# Patient Record
Sex: MALE | Race: BLACK OR AFRICAN AMERICAN | Employment: FULL TIME | URBAN - METROPOLITAN AREA
[De-identification: names, ages, dates, MRNs, and addresses within clinical notes are randomized per-mention and may not be internally consistent; named-entity substitution may affect disease eponyms.]

---

## 2017-11-03 ENCOUNTER — APPOINTMENT (OUTPATIENT)
Dept: GENERAL RADIOLOGY | Age: 22
End: 2017-11-03
Attending: EMERGENCY MEDICINE
Payer: MEDICAID

## 2017-11-03 ENCOUNTER — APPOINTMENT (OUTPATIENT)
Dept: CT IMAGING | Age: 22
End: 2017-11-03
Attending: PHYSICIAN ASSISTANT
Payer: MEDICAID

## 2017-11-03 ENCOUNTER — HOSPITAL ENCOUNTER (EMERGENCY)
Age: 22
Discharge: HOME | End: 2017-11-03
Attending: EMERGENCY MEDICINE
Payer: MEDICAID

## 2017-11-03 VITALS
TEMPERATURE: 98 F | DIASTOLIC BLOOD PRESSURE: 61 MMHG | BODY MASS INDEX: 23 KG/M2 | WEIGHT: 165 LBS | SYSTOLIC BLOOD PRESSURE: 130 MMHG | HEART RATE: 72 BPM | RESPIRATION RATE: 20 BRPM | OXYGEN SATURATION: 99 %

## 2017-11-03 DIAGNOSIS — S80.11XA CONTUSION OF RIGHT LOWER LEG, INITIAL ENCOUNTER: ICD-10-CM

## 2017-11-03 DIAGNOSIS — S05.92XA LEFT EYE INJURY, INITIAL ENCOUNTER: ICD-10-CM

## 2017-11-03 DIAGNOSIS — S09.90XA INJURY OF HEAD, INITIAL ENCOUNTER: Primary | ICD-10-CM

## 2017-11-03 DIAGNOSIS — S90.31XA CONTUSION OF RIGHT FOOT, INITIAL ENCOUNTER: ICD-10-CM

## 2017-11-03 PROCEDURE — 96372 THER/PROPH/DIAG INJ SC/IM: CPT

## 2017-11-03 PROCEDURE — 99284 EMERGENCY DEPT VISIT MOD MDM: CPT

## 2017-11-03 PROCEDURE — 73610 X-RAY EXAM OF ANKLE: CPT | Performed by: EMERGENCY MEDICINE

## 2017-11-03 PROCEDURE — 73590 X-RAY EXAM OF LOWER LEG: CPT | Performed by: EMERGENCY MEDICINE

## 2017-11-03 PROCEDURE — 73630 X-RAY EXAM OF FOOT: CPT | Performed by: EMERGENCY MEDICINE

## 2017-11-03 PROCEDURE — 70486 CT MAXILLOFACIAL W/O DYE: CPT | Performed by: EMERGENCY MEDICINE

## 2017-11-03 PROCEDURE — 70450 CT HEAD/BRAIN W/O DYE: CPT | Performed by: EMERGENCY MEDICINE

## 2017-11-03 RX ORDER — HYDROCODONE BITARTRATE AND ACETAMINOPHEN 5; 325 MG/1; MG/1
1-2 TABLET ORAL EVERY 6 HOURS PRN
Qty: 20 TABLET | Refills: 0 | Status: SHIPPED | OUTPATIENT
Start: 2017-11-03 | End: 2017-11-10

## 2017-11-03 RX ORDER — TETRACAINE HYDROCHLORIDE 5 MG/ML
1 SOLUTION OPHTHALMIC ONCE
Status: COMPLETED | OUTPATIENT
Start: 2017-11-03 | End: 2017-11-03

## 2017-11-03 RX ORDER — TETRACAINE HYDROCHLORIDE 5 MG/ML
SOLUTION OPHTHALMIC
Status: COMPLETED
Start: 2017-11-03 | End: 2017-11-03

## 2017-11-03 RX ORDER — HYDROMORPHONE HYDROCHLORIDE 1 MG/ML
1 INJECTION, SOLUTION INTRAMUSCULAR; INTRAVENOUS; SUBCUTANEOUS ONCE
Status: COMPLETED | OUTPATIENT
Start: 2017-11-03 | End: 2017-11-03

## 2017-11-03 NOTE — ED INITIAL ASSESSMENT (HPI)
States while at work he was lifting pallets out of truck, pallets fell and landed on right foot and struck him in face and fell under the pallets unknown loc per co worker. Patient with large hematoma noted to right eye and upper lid.  Also pain and swellin

## 2017-11-03 NOTE — ED PROVIDER NOTES
Patient Seen in: THE [facility] Emergency Department In [place]    History   Patient presents with:  Lower Extremity Injury (musculoskeletal)  Head Neck Injury (neurologic, musculoskeletal)    Stated Complaint: Pallet wood fell on patient- eye pain and right 18  Temp: 98 °F (36.7 °C)  Temp src: Temporal  SpO2: 100 %  O2 Device: None (Room air)    Current:/61   Pulse 72   Temp 98 °F (36.7 °C) (Temporal)   Resp 20   Wt 74.8 kg   SpO2 99%   BMI 23.01 kg/m²     Physical Exam   Constitutional: He is oriented Course  ------------------------------------------------------------   This case is discussed with Dr. Silvia Espana who evaluates patient agrees with the plan of care.   Xr Ankle (min 3 Views), Right (cpt=73610)    Result Date: 11/3/2017  PROCEDURE:  XR ANKLE (MI knee joint. Pain and swelling distal half of right lower leg laterally. FINDINGS:  BONES:  No significant arthropathy or acute abnormality. SOFT TISSUES:  No visible soft tissue swelling. EFFUSION:  None visible. CONCLUSION:  1.  Osseous structures scanning is performed through the facial bones. 3D shaded surface renderings are created on an independent CT scanner workstation. Dose reduction techniques were used.  Dose information is transmitted to the [facility]) Nathen Vasquez [address] today and remains so upon discharge. I discuss the plan of care with the patient, who expresses understanding. All questions and concerns are addressed to the patient's satisfaction prior to discharge today.         Disposition and Plan     Clinical Impre

## 2017-11-08 ENCOUNTER — OFFICE VISIT (OUTPATIENT)
Dept: OTHER | Facility: HOSPITAL | Age: 22
End: 2017-11-08
Attending: FAMILY MEDICINE
Payer: OTHER MISCELLANEOUS

## 2017-11-08 ENCOUNTER — HOSPITAL ENCOUNTER (OUTPATIENT)
Dept: GENERAL RADIOLOGY | Facility: HOSPITAL | Age: 22
Discharge: HOME | End: 2017-11-08
Attending: FAMILY MEDICINE
Payer: OTHER MISCELLANEOUS

## 2017-11-08 DIAGNOSIS — S80.01XD CONTUSION OF RIGHT KNEE, SUBSEQUENT ENCOUNTER: Primary | ICD-10-CM

## 2017-11-08 PROCEDURE — 73564 X-RAY EXAM KNEE 4 OR MORE: CPT | Performed by: FAMILY MEDICINE

## 2017-11-08 RX ORDER — TRAMADOL HYDROCHLORIDE 50 MG/1
TABLET ORAL
Qty: 30 TABLET | Refills: 0 | Status: SHIPPED | OUTPATIENT
Start: 2017-11-08 | End: 2019-01-28

## 2017-11-15 ENCOUNTER — APPOINTMENT (OUTPATIENT)
Dept: OTHER | Facility: HOSPITAL | Age: 22
End: 2017-11-15
Attending: PREVENTIVE MEDICINE
Payer: OTHER MISCELLANEOUS

## 2017-11-16 ENCOUNTER — APPOINTMENT (OUTPATIENT)
Dept: PHYSICAL THERAPY | Age: 22
End: 2017-11-16
Attending: PREVENTIVE MEDICINE
Payer: OTHER MISCELLANEOUS

## 2017-11-16 PROBLEM — S97.81XA CRUSHING INJURY OF RIGHT FOOT, INITIAL ENCOUNTER: Status: ACTIVE | Noted: 2017-11-16

## 2017-11-20 ENCOUNTER — APPOINTMENT (OUTPATIENT)
Dept: PHYSICAL THERAPY | Age: 22
End: 2017-11-20
Attending: PREVENTIVE MEDICINE
Payer: OTHER MISCELLANEOUS

## 2017-11-22 ENCOUNTER — APPOINTMENT (OUTPATIENT)
Dept: PHYSICAL THERAPY | Age: 22
End: 2017-11-22
Attending: PREVENTIVE MEDICINE
Payer: OTHER MISCELLANEOUS

## 2017-11-27 ENCOUNTER — APPOINTMENT (OUTPATIENT)
Dept: PHYSICAL THERAPY | Age: 22
End: 2017-11-27
Attending: PREVENTIVE MEDICINE
Payer: OTHER MISCELLANEOUS

## 2017-11-30 ENCOUNTER — APPOINTMENT (OUTPATIENT)
Dept: PHYSICAL THERAPY | Age: 22
End: 2017-11-30
Attending: PREVENTIVE MEDICINE
Payer: OTHER MISCELLANEOUS

## 2017-12-04 ENCOUNTER — APPOINTMENT (OUTPATIENT)
Dept: PHYSICAL THERAPY | Age: 22
End: 2017-12-04
Attending: PREVENTIVE MEDICINE
Payer: OTHER MISCELLANEOUS

## 2017-12-07 ENCOUNTER — APPOINTMENT (OUTPATIENT)
Dept: PHYSICAL THERAPY | Age: 22
End: 2017-12-07
Attending: PREVENTIVE MEDICINE
Payer: OTHER MISCELLANEOUS

## 2017-12-18 PROBLEM — T14.8XXA BONE BRUISE: Status: ACTIVE | Noted: 2017-12-18

## 2018-02-23 ENCOUNTER — OFFICE VISIT (OUTPATIENT)
Dept: PHYSICAL THERAPY | Age: 23
End: 2018-02-23
Attending: ORTHOPAEDIC SURGERY
Payer: MEDICAID

## 2018-02-23 DIAGNOSIS — S97.81XA CRUSHING INJURY OF RIGHT FOOT, INITIAL ENCOUNTER: ICD-10-CM

## 2018-02-23 PROCEDURE — 97162 PT EVAL MOD COMPLEX 30 MIN: CPT

## 2018-02-23 PROCEDURE — 97110 THERAPEUTIC EXERCISES: CPT

## 2018-02-23 NOTE — PROGRESS NOTES
LOWER EXTREMITY EVALUATION:   Referring Physician: Dr. Saadia Shin  Diagnosis: R foot crush injury     Date of Service: 2/23/2018     PATIENT SUMMARY   Manfred Strauss is a 23year old y/o male who presents to therapy today with complaints of R foot pain since cru normal, and get rid of the tingling in his foot. Past medical history was reviewed with Miguel Ángel Ambriz.  Significant findings include asthma, smokes ~1/4 pack per day    [address] presents with high pain irritability, decreased R ankle AROM, poor tolerance channel sensitivity (cold, stress)  · The concept of neuroplasticity and how factors such as temperature, stress, movement, immunity and blood flow affect pain via ion channel expression.   · The concepts of hyperalgesia   · Plasticity of the nervous system have improved SLS to >30s for increased ankle stability with ambulation on uneven surfaces (10 visits)  · Pt will report <3/10 pain with activities such as floor transfers and driving >20 min (10 visits)  · Pt will be independent and compliant with compreh

## 2018-02-28 ENCOUNTER — OFFICE VISIT (OUTPATIENT)
Dept: PHYSICAL THERAPY | Age: 23
End: 2018-02-28
Attending: ORTHOPAEDIC SURGERY
Payer: MEDICAID

## 2018-02-28 PROCEDURE — 97110 THERAPEUTIC EXERCISES: CPT

## 2018-02-28 NOTE — PROGRESS NOTES
Dx: R foot crush injury         Authorized # of Visits:  Medicaid         Next MD visit: none scheduled   Fall Risk: standard         Precautions: n/a             Subjective: Did good with the HEP; did the alphabet a lot because it feels good to go 'side t JAVI warren on step* x10         Seated tilt board -A/P x20  -M/L x20  -CW/CCW x15 ea         PF STM with tennis ball x1 min (hurts)  -green therabar roll x2 min         YTB  -DF 2x8  -PF 2x8  -INV 2x8  -EV 2x8         Skilled Services: Reviewed HEP*.  Rodney

## 2018-03-02 ENCOUNTER — OFFICE VISIT (OUTPATIENT)
Dept: PHYSICAL THERAPY | Age: 23
End: 2018-03-02
Attending: ORTHOPAEDIC SURGERY
Payer: MEDICAID

## 2018-03-02 DIAGNOSIS — S97.81XA CRUSHING INJURY OF RIGHT FOOT, INITIAL ENCOUNTER: ICD-10-CM

## 2018-03-02 PROCEDURE — 97110 THERAPEUTIC EXERCISES: CPT

## 2018-03-02 NOTE — PROGRESS NOTES
Dx: R foot crush injury         Authorized # of Visits:  Medicaid         Next MD visit: none scheduled   Fall Risk: standard         Precautions: n/a           25 min late. (thought his appt was 8 vs 7:45)  Subjective: Felt 'ok' after last PT session.  Did -OKC DF/PF x10    -x10  -x10        Seated ankle alphabet* x1 -        Towel crunch* HEP; marble  x16 (hard) x16 (3 min)        TC glides on step* x10 -        Seated tilt board -A/P x20  -M/L x20  -CW/CCW x15 ea   -x1 min  -x1 min  -x1 min ea

## 2018-03-05 ENCOUNTER — OFFICE VISIT (OUTPATIENT)
Dept: PHYSICAL THERAPY | Age: 23
End: 2018-03-05
Attending: ORTHOPAEDIC SURGERY
Payer: MEDICAID

## 2018-03-05 DIAGNOSIS — S97.81XA CRUSHING INJURY OF RIGHT FOOT, INITIAL ENCOUNTER: ICD-10-CM

## 2018-03-05 PROCEDURE — 97110 THERAPEUTIC EXERCISES: CPT

## 2018-03-05 NOTE — PROGRESS NOTES
Dx: R foot crush injury         Authorized # of Visits:  Medicaid         Next MD visit: none scheduled   Fall Risk: standard         Precautions: n/a           6 min late  Subjective: Reports he was a little looser after the last session, but by 9pm that independent and compliant with comprehensive HEP to maintain progress achieved in PT (10 visits) -progress    Plan: slow progress with goals, but continue to promote ROM with progressive wt bearing activities as tolerated; try IFC at end of session for inc

## 2018-03-09 ENCOUNTER — OFFICE VISIT (OUTPATIENT)
Dept: PHYSICAL THERAPY | Age: 23
End: 2018-03-09
Attending: ORTHOPAEDIC SURGERY
Payer: MEDICAID

## 2018-03-09 DIAGNOSIS — S97.81XA CRUSHING INJURY OF RIGHT FOOT, INITIAL ENCOUNTER: ICD-10-CM

## 2018-03-09 PROCEDURE — 97110 THERAPEUTIC EXERCISES: CPT

## 2018-03-09 NOTE — PROGRESS NOTES
Progress Summary  Pt has attended 5, cancelled 0, and no shown 0 visits in Physical Therapy.      Dx: R foot crush injury         Authorized # of Visits:  Medicaid         Next MD visit: none scheduled   Fall Risk: standard         Precautions: n/a medication, etc) to help improve efficacy of therapy.      Objective: tender to light touch L lateral malleolus, 4th and 5th digit, cuboid      Goals:    · Pt will demonstrate improved DF AROM to >= 10 degrees to promote proper foot clearance during gait an 4/10 Date: 3/9/2018 TX#: 5/10 Date:               TX#: 6/ Date:               TX#: 7/ Date:               TX#: 8/   Bike L1 x4 min x3 min x4 min -      STM L foot very light pressure x8 min x8 min x8 min (Min pressure) x8 min with biofreeze      AROM seate mechanisms and strategies to increase the brain's production of chemicals which decrease pain such as aerobic exercise and improved pain knowledge (how to make a 'dry' brain 'wet')    Current best evidence research indicates that when a therapist explains

## 2018-03-12 ENCOUNTER — TELEPHONE (OUTPATIENT)
Dept: PHYSICAL THERAPY | Age: 23
End: 2018-03-12

## 2018-03-12 NOTE — TELEPHONE ENCOUNTER
No show. Called to follow up/confirm next visit. Pt reports he forgot his appt today.  Tried to call MD office regarding a follow-up appt; was told Dr. Grady Ellis was not available over the phone and he would not be able to set up a follow-up appt with Dr. Grady Ellis

## 2018-03-16 ENCOUNTER — APPOINTMENT (OUTPATIENT)
Dept: PHYSICAL THERAPY | Age: 23
End: 2018-03-16
Attending: ORTHOPAEDIC SURGERY
Payer: MEDICAID

## 2018-03-19 ENCOUNTER — OFFICE VISIT (OUTPATIENT)
Dept: PHYSICAL THERAPY | Age: 23
End: 2018-03-19
Attending: ORTHOPAEDIC SURGERY
Payer: MEDICAID

## 2018-03-19 DIAGNOSIS — S97.81XA CRUSHING INJURY OF RIGHT FOOT, INITIAL ENCOUNTER: ICD-10-CM

## 2018-03-19 PROCEDURE — 97110 THERAPEUTIC EXERCISES: CPT

## 2018-03-19 NOTE — PROGRESS NOTES
Dx: R foot crush injury         Authorized # of Visits:  Medicaid         Next MD visit: none scheduled   Fall Risk: standard         Precautions: n/a             Subjective:  Went for [employer] day Saturday and took his son to the [place] and [employer] w TX#: 6/10 Date:               TX#: 7/ Date:               TX#: 8/   Bike L1 x4 min x3 min x4 min - x5 min     STM L foot very light pressure x8 min x8 min x8 min (Min pressure) x8 min with biofreeze x8 min with biofreeze     AROM seated   -heel raise* x10

## 2018-03-23 ENCOUNTER — OFFICE VISIT (OUTPATIENT)
Dept: PHYSICAL THERAPY | Age: 23
End: 2018-03-23
Attending: ORTHOPAEDIC SURGERY
Payer: MEDICAID

## 2018-03-23 DIAGNOSIS — S97.81XA CRUSHING INJURY OF RIGHT FOOT, INITIAL ENCOUNTER: ICD-10-CM

## 2018-03-23 PROCEDURE — 97110 THERAPEUTIC EXERCISES: CPT

## 2018-03-23 NOTE — PROGRESS NOTES
Dx: R foot crush injury         Authorized # of Visits:  Medicaid         Next MD visit: none scheduled   Fall Risk: standard         Precautions: n/a             Subjective: Was in a lot of pain this week; thinks he did too much 'trying to be a superhero stabilization as tolerated; resume tape PRN    Date: 2/28/2018 TX#: 2/10 Date:3/2/2018 TX#: 3/10   Date: 3/5/2018 TX#: 4/10 Date: 3/9/2018 TX#: 5/10 Date: 3/19/2018   TX#: 6/10 Date:3/23/2018   TX#: 7/10 Date:               TX#: 8/   Bike L1 x4 min x3 min with R back) - TNE x10 min  Ice x10 min x10 min    Skilled Services: Increased TC mobilizations to help improve DF AROM and decrease anterior joint pain. Held standing tilt board due to irritation after last visit.  Continued standing stabilization/toleranc

## 2018-04-02 ENCOUNTER — APPOINTMENT (OUTPATIENT)
Dept: PHYSICAL THERAPY | Age: 23
End: 2018-04-02
Attending: ORTHOPAEDIC SURGERY
Payer: MEDICAID

## 2018-04-04 ENCOUNTER — APPOINTMENT (OUTPATIENT)
Dept: PHYSICAL THERAPY | Age: 23
End: 2018-04-04
Attending: ORTHOPAEDIC SURGERY
Payer: MEDICAID

## 2018-04-11 ENCOUNTER — APPOINTMENT (OUTPATIENT)
Dept: PHYSICAL THERAPY | Age: 23
End: 2018-04-11
Attending: ORTHOPAEDIC SURGERY
Payer: MEDICAID

## 2018-04-13 ENCOUNTER — APPOINTMENT (OUTPATIENT)
Dept: PHYSICAL THERAPY | Age: 23
End: 2018-04-13
Attending: ORTHOPAEDIC SURGERY
Payer: MEDICAID

## 2018-04-16 ENCOUNTER — APPOINTMENT (OUTPATIENT)
Dept: PHYSICAL THERAPY | Age: 23
End: 2018-04-16
Attending: ORTHOPAEDIC SURGERY
Payer: MEDICAID

## 2018-04-18 ENCOUNTER — APPOINTMENT (OUTPATIENT)
Dept: PHYSICAL THERAPY | Age: 23
End: 2018-04-18
Attending: ORTHOPAEDIC SURGERY
Payer: MEDICAID

## 2019-01-28 ENCOUNTER — APPOINTMENT (OUTPATIENT)
Dept: CT IMAGING | Facility: HOSPITAL | Age: 24
End: 2019-01-28
Attending: EMERGENCY MEDICINE
Payer: MEDICAID

## 2019-01-28 ENCOUNTER — HOSPITAL ENCOUNTER (EMERGENCY)
Facility: HOSPITAL | Age: 24
Discharge: HOME | End: 2019-01-28
Attending: EMERGENCY MEDICINE
Payer: MEDICAID

## 2019-01-28 ENCOUNTER — APPOINTMENT (OUTPATIENT)
Dept: GENERAL RADIOLOGY | Facility: HOSPITAL | Age: 24
End: 2019-01-28
Attending: EMERGENCY MEDICINE
Payer: MEDICAID

## 2019-01-28 VITALS
HEIGHT: 74 IN | WEIGHT: 175 LBS | SYSTOLIC BLOOD PRESSURE: 116 MMHG | OXYGEN SATURATION: 99 % | RESPIRATION RATE: 14 BRPM | DIASTOLIC BLOOD PRESSURE: 88 MMHG | TEMPERATURE: 99 F | HEART RATE: 79 BPM | BODY MASS INDEX: 22.46 KG/M2

## 2019-01-28 DIAGNOSIS — R10.9 RIGHT FLANK PAIN: Primary | ICD-10-CM

## 2019-01-28 LAB
BILIRUB UR QL STRIP.AUTO: NEGATIVE
COLOR UR AUTO: YELLOW
GLUCOSE UR STRIP.AUTO-MCNC: NEGATIVE MG/DL
KETONES UR STRIP.AUTO-MCNC: NEGATIVE MG/DL
LEUKOCYTE ESTERASE UR QL STRIP.AUTO: NEGATIVE
NITRITE UR QL STRIP.AUTO: NEGATIVE
PH UR STRIP.AUTO: 6 [PH] (ref 4.5–8)
PROT UR STRIP.AUTO-MCNC: NEGATIVE MG/DL
RBC UR QL AUTO: NEGATIVE
SP GR UR STRIP.AUTO: 1.03 (ref 1–1.03)
UROBILINOGEN UR STRIP.AUTO-MCNC: 1 MG/DL

## 2019-01-28 PROCEDURE — 87086 URINE CULTURE/COLONY COUNT: CPT | Performed by: EMERGENCY MEDICINE

## 2019-01-28 PROCEDURE — 71045 X-RAY EXAM CHEST 1 VIEW: CPT | Performed by: EMERGENCY MEDICINE

## 2019-01-28 PROCEDURE — 81003 URINALYSIS AUTO W/O SCOPE: CPT | Performed by: EMERGENCY MEDICINE

## 2019-01-28 PROCEDURE — 74176 CT ABD & PELVIS W/O CONTRAST: CPT | Performed by: EMERGENCY MEDICINE

## 2019-01-28 PROCEDURE — 99285 EMERGENCY DEPT VISIT HI MDM: CPT

## 2019-01-28 PROCEDURE — 96372 THER/PROPH/DIAG INJ SC/IM: CPT

## 2019-01-28 PROCEDURE — 99284 EMERGENCY DEPT VISIT MOD MDM: CPT

## 2019-01-28 RX ORDER — MELOXICAM 15 MG/1
15 TABLET ORAL
COMMUNITY
Start: 2018-10-30

## 2019-01-28 RX ORDER — KETOROLAC TROMETHAMINE 30 MG/ML
30 INJECTION, SOLUTION INTRAMUSCULAR; INTRAVENOUS ONCE
Status: COMPLETED | OUTPATIENT
Start: 2019-01-28 | End: 2019-01-28

## 2019-01-28 RX ORDER — DIAZEPAM 5 MG/1
5 TABLET ORAL 3 TIMES DAILY PRN
Qty: 20 TABLET | Refills: 0 | Status: SHIPPED | OUTPATIENT
Start: 2019-01-28 | End: 2019-02-04

## 2019-01-28 RX ORDER — NAPROXEN 500 MG/1
500 TABLET ORAL 2 TIMES DAILY PRN
Qty: 20 TABLET | Refills: 0 | Status: SHIPPED | OUTPATIENT
Start: 2019-01-28 | End: 2019-02-04

## 2019-01-28 RX ORDER — MONTELUKAST SODIUM 10 MG/1
10 TABLET ORAL
COMMUNITY

## 2019-01-28 RX ORDER — FLUTICASONE PROPIONATE AND SALMETEROL 100; 50 UG/1; UG/1
1 POWDER RESPIRATORY (INHALATION)
COMMUNITY

## 2019-01-28 RX ORDER — ALBUTEROL SULFATE 0.75 MG/3ML
SOLUTION RESPIRATORY (INHALATION)
COMMUNITY

## 2019-01-29 NOTE — ED PROVIDER NOTES
Patient Seen in: [facility] Emergency Department    History   Patient presents with:  Back Pain (musculoskeletal)    Stated Complaint: back pain, no trauma or injury/worse with movement/no urinary complaints    HPI    Patient here for evaluation of 14   Ht 188 cm (6' 2\")   Wt 79.4 kg   SpO2 99%   BMI 22.47 kg/m²         Physical Exam      Constitutional: No distress. Appears well-developed and well-nourished. Head: Normocephalic and atraumatic.    Nose: Nose normal.   Eyes: EOM are normal. Pupils a cross. Patient had a negative d-dimer, negative troponin, clear chest x-ray. EKG without any ST changes or T wave inversions.       Disposition and Plan     Clinical Impression:  Right flank pain  (primary encounter diagnosis)    Disposition:  Discharge

## 2021-06-12 ENCOUNTER — HOSPITAL ENCOUNTER (EMERGENCY)
Age: 26
Discharge: HOME | End: 2021-06-12
Attending: EMERGENCY MEDICINE
Payer: COMMERCIAL

## 2021-06-12 ENCOUNTER — APPOINTMENT (OUTPATIENT)
Dept: CT IMAGING | Age: 26
End: 2021-06-12
Attending: EMERGENCY MEDICINE
Payer: COMMERCIAL

## 2021-06-12 ENCOUNTER — APPOINTMENT (OUTPATIENT)
Dept: ULTRASOUND IMAGING | Age: 26
End: 2021-06-12
Attending: EMERGENCY MEDICINE
Payer: COMMERCIAL

## 2021-06-12 VITALS
BODY MASS INDEX: 23.74 KG/M2 | TEMPERATURE: 98 F | DIASTOLIC BLOOD PRESSURE: 65 MMHG | HEIGHT: 74 IN | OXYGEN SATURATION: 98 % | SYSTOLIC BLOOD PRESSURE: 115 MMHG | RESPIRATION RATE: 16 BRPM | WEIGHT: 185 LBS | HEART RATE: 55 BPM

## 2021-06-12 DIAGNOSIS — N34.2 URETHRITIS: Primary | ICD-10-CM

## 2021-06-12 DIAGNOSIS — R10.31 RIGHT GROIN PAIN: ICD-10-CM

## 2021-06-12 PROCEDURE — 87591 N.GONORRHOEAE DNA AMP PROB: CPT | Performed by: EMERGENCY MEDICINE

## 2021-06-12 PROCEDURE — 87491 CHLMYD TRACH DNA AMP PROBE: CPT | Performed by: EMERGENCY MEDICINE

## 2021-06-12 PROCEDURE — 87086 URINE CULTURE/COLONY COUNT: CPT | Performed by: EMERGENCY MEDICINE

## 2021-06-12 PROCEDURE — 96374 THER/PROPH/DIAG INJ IV PUSH: CPT

## 2021-06-12 PROCEDURE — 93975 VASCULAR STUDY: CPT | Performed by: EMERGENCY MEDICINE

## 2021-06-12 PROCEDURE — 96361 HYDRATE IV INFUSION ADD-ON: CPT

## 2021-06-12 PROCEDURE — 74176 CT ABD & PELVIS W/O CONTRAST: CPT | Performed by: EMERGENCY MEDICINE

## 2021-06-12 PROCEDURE — 99284 EMERGENCY DEPT VISIT MOD MDM: CPT

## 2021-06-12 PROCEDURE — 76870 US EXAM SCROTUM: CPT | Performed by: EMERGENCY MEDICINE

## 2021-06-12 PROCEDURE — 85025 COMPLETE CBC W/AUTO DIFF WBC: CPT | Performed by: EMERGENCY MEDICINE

## 2021-06-12 PROCEDURE — 99285 EMERGENCY DEPT VISIT HI MDM: CPT

## 2021-06-12 PROCEDURE — 96372 THER/PROPH/DIAG INJ SC/IM: CPT

## 2021-06-12 PROCEDURE — 81001 URINALYSIS AUTO W/SCOPE: CPT | Performed by: EMERGENCY MEDICINE

## 2021-06-12 PROCEDURE — 80053 COMPREHEN METABOLIC PANEL: CPT | Performed by: EMERGENCY MEDICINE

## 2021-06-12 RX ORDER — AZITHROMYCIN 250 MG/1
1000 TABLET, FILM COATED ORAL ONCE
Status: COMPLETED | OUTPATIENT
Start: 2021-06-12 | End: 2021-06-12

## 2021-06-12 RX ORDER — KETOROLAC TROMETHAMINE 15 MG/ML
15 INJECTION, SOLUTION INTRAMUSCULAR; INTRAVENOUS ONCE
Status: COMPLETED | OUTPATIENT
Start: 2021-06-12 | End: 2021-06-12

## 2021-06-12 NOTE — ED INITIAL ASSESSMENT (HPI)
Right lower abdom pain, constant, that goes into right testicle and right leg, no swelling, no injury, denies nausea/vomiting/fever/diarrhea, started 2 days ago

## 2021-06-13 NOTE — ED PROVIDER NOTES
Patient Seen in: [address] Emergency Department In [facility]      History   Patient presents with:  Abdomen/Flank Pain    Stated Complaint: RLQ pain x 2 days    HPI/Subjective:   HPI    22-year-old male presents to emergency department with chief complaint (36.4 °C) (Temporal)   Resp 16   Ht 188 cm (6' 2\")   Wt 83.9 kg   SpO2 98%   BMI 23.75 kg/m²         Physical Exam    GENERAL: Patient is awake, alert, well-appearing, in no acute distress. HEENT: no scleral icterus.   Mucous membranes are moist  HEART: R Final result                 Please view results for these tests on the individual orders.    URINE CULTURE, ROUTINE   CHLAMYDIA/GONOCOCCUS, FRANCO   CBC W/ DIFFERENTIAL                   MDM      Patient IV established, blood work o

## 2021-10-21 ENCOUNTER — HOSPITAL ENCOUNTER (EMERGENCY)
Age: 26
Discharge: HOME | End: 2021-10-21
Attending: EMERGENCY MEDICINE

## 2021-10-21 ENCOUNTER — APPOINTMENT (OUTPATIENT)
Dept: GENERAL RADIOLOGY | Age: 26
End: 2021-10-21
Attending: EMERGENCY MEDICINE

## 2021-10-21 VITALS
SYSTOLIC BLOOD PRESSURE: 138 MMHG | RESPIRATION RATE: 18 BRPM | TEMPERATURE: 98 F | WEIGHT: 185 LBS | HEIGHT: 74 IN | DIASTOLIC BLOOD PRESSURE: 78 MMHG | HEART RATE: 69 BPM | OXYGEN SATURATION: 100 % | BODY MASS INDEX: 23.74 KG/M2

## 2021-10-21 DIAGNOSIS — S96.911A RIGHT FOOT STRAIN, INITIAL ENCOUNTER: Primary | ICD-10-CM

## 2021-10-21 PROCEDURE — 73630 X-RAY EXAM OF FOOT: CPT | Performed by: EMERGENCY MEDICINE

## 2021-10-21 PROCEDURE — 73610 X-RAY EXAM OF ANKLE: CPT | Performed by: EMERGENCY MEDICINE

## 2021-10-21 PROCEDURE — 99283 EMERGENCY DEPT VISIT LOW MDM: CPT

## 2021-10-21 PROCEDURE — 99284 EMERGENCY DEPT VISIT MOD MDM: CPT

## 2021-10-21 RX ORDER — HYDROCODONE BITARTRATE AND ACETAMINOPHEN 5; 325 MG/1; MG/1
1-2 TABLET ORAL EVERY 6 HOURS PRN
Qty: 10 TABLET | Refills: 0 | Status: SHIPPED | OUTPATIENT
Start: 2021-10-21 | End: 2021-10-28

## 2021-10-21 NOTE — ED PROVIDER NOTES
Patient Seen in: THE [facility] Emergency Department In [place]      History   Patient presents with:  Leg or Foot Injury    Stated Complaint: injured right foot playing baseball yesterday    Subjective:   20-year-old male presents with right foot and ankle p the lateral malleolus and proximal fibula. No base of 5th metatarsal tenderness. Right foot: Swelling and tenderness present. Feet:    Skin:     General: Skin is warm and dry. Capillary Refill: Capillary refill takes less than 2 seconds. identified. Phalangeal relationships are within normal limits. No significant ankle joint effusion. CONCLUSION:  No acute findings.    Dictated by (CST): Tarik Bassett MD on 10/21/2021 at 1:25 PM     Finalized by (CST): Tarik Bassett MD

## 2021-10-21 NOTE — ED INITIAL ASSESSMENT (HPI)
Pt injured his right foot playing baseball yesterday. States he stepped on a base and twisted his foot. Pt had previous surgery on foot.  Swelling noted

## 2021-12-27 ENCOUNTER — APPOINTMENT (OUTPATIENT)
Dept: GENERAL RADIOLOGY | Age: 26
End: 2021-12-27
Attending: EMERGENCY MEDICINE

## 2021-12-27 ENCOUNTER — HOSPITAL ENCOUNTER (EMERGENCY)
Age: 26
Discharge: HOME | End: 2021-12-27
Attending: EMERGENCY MEDICINE

## 2021-12-27 VITALS
HEIGHT: 74 IN | SYSTOLIC BLOOD PRESSURE: 130 MMHG | HEART RATE: 76 BPM | BODY MASS INDEX: 23.74 KG/M2 | TEMPERATURE: 97 F | WEIGHT: 185 LBS | DIASTOLIC BLOOD PRESSURE: 70 MMHG | OXYGEN SATURATION: 98 % | RESPIRATION RATE: 17 BRPM

## 2021-12-27 DIAGNOSIS — G89.29 CHRONIC RIGHT SHOULDER PAIN: Primary | ICD-10-CM

## 2021-12-27 DIAGNOSIS — M25.511 CHRONIC RIGHT SHOULDER PAIN: Primary | ICD-10-CM

## 2021-12-27 PROCEDURE — 73030 X-RAY EXAM OF SHOULDER: CPT | Performed by: EMERGENCY MEDICINE

## 2021-12-27 PROCEDURE — 99284 EMERGENCY DEPT VISIT MOD MDM: CPT | Performed by: EMERGENCY MEDICINE

## 2021-12-27 PROCEDURE — 72050 X-RAY EXAM NECK SPINE 4/5VWS: CPT | Performed by: EMERGENCY MEDICINE

## 2021-12-27 RX ORDER — IBUPROFEN 600 MG/1
600 TABLET ORAL ONCE
Status: COMPLETED | OUTPATIENT
Start: 2021-12-27 | End: 2021-12-27

## 2021-12-27 RX ORDER — METHYLPREDNISOLONE 4 MG/1
TABLET ORAL
Qty: 1 EACH | Refills: 0 | Status: SHIPPED | OUTPATIENT
Start: 2021-12-27

## 2021-12-28 NOTE — ED PROVIDER NOTES
Patient Seen in: THE [facility] Emergency Department In [place]      History   Patient presents with:  Arm or Hand Injury    Stated Complaint: right shoulder pain for two months worse this week    Subjective:   HPI    Patient is a pleasant 20-year-old male, r Extraocular Movements: Extraocular movements intact. Conjunctiva/sclera: Conjunctivae normal.   Neck:      Trachea: Trachea and phonation normal.     Cardiovascular:      Rate and Rhythm: Normal rate.    Pulmonary:      Effort: Pulmonary effort is norm VIEWS), RIGHT (CPT=73030)    Result Date: 12/27/2021            PROCEDURE:  XR SHOULDER, COMPLETE (MIN 2 VIEWS), RIGHT (CPT=73030)     TECHNIQUE:  Multiple views were obtained.   COMPARISON:  [facility], XR, SHOULDER (COMPLETE),RIGHT XRAY, 11/22/2012, 5:53 Prescribed:  Current Discharge Medication List    START taking these medications    methylPREDNISolone (MEDROL) 4 MG Oral Tablet Therapy Pack  Dosepack: take as directed  Qty: 1 each Refills: 0

## 2022-05-25 ENCOUNTER — APPOINTMENT (OUTPATIENT)
Dept: GENERAL RADIOLOGY | Age: 27
End: 2022-05-25
Attending: EMERGENCY MEDICINE

## 2022-05-25 ENCOUNTER — HOSPITAL ENCOUNTER (EMERGENCY)
Age: 27
Discharge: HOME | End: 2022-05-25
Attending: EMERGENCY MEDICINE

## 2022-05-25 VITALS
DIASTOLIC BLOOD PRESSURE: 59 MMHG | HEART RATE: 59 BPM | RESPIRATION RATE: 89 BRPM | OXYGEN SATURATION: 97 % | SYSTOLIC BLOOD PRESSURE: 115 MMHG | TEMPERATURE: 100 F | HEIGHT: 72 IN | WEIGHT: 165 LBS | BODY MASS INDEX: 22.35 KG/M2

## 2022-05-25 DIAGNOSIS — U07.1 COVID: ICD-10-CM

## 2022-05-25 DIAGNOSIS — S39.012A LUMBAR STRAIN, INITIAL ENCOUNTER: Primary | ICD-10-CM

## 2022-05-25 LAB
ALBUMIN SERPL-MCNC: 4.1 G/DL (ref 3.4–5)
ALBUMIN/GLOB SERPL: 1.2 {RATIO} (ref 1–2)
ALP LIVER SERPL-CCNC: 66 U/L
ALT SERPL-CCNC: 17 U/L
ANION GAP SERPL CALC-SCNC: 6 MMOL/L (ref 0–18)
AST SERPL-CCNC: 15 U/L (ref 15–37)
BASOPHILS # BLD AUTO: 0.03 X10(3) UL (ref 0–0.2)
BASOPHILS NFR BLD AUTO: 0.5 %
BILIRUB SERPL-MCNC: 0.4 MG/DL (ref 0.1–2)
BILIRUB UR QL STRIP.AUTO: NEGATIVE
BUN BLD-MCNC: 8 MG/DL (ref 7–18)
CALCIUM BLD-MCNC: 8.9 MG/DL (ref 8.5–10.1)
CHLORIDE SERPL-SCNC: 104 MMOL/L (ref 98–112)
CLARITY UR REFRACT.AUTO: CLEAR
CO2 SERPL-SCNC: 25 MMOL/L (ref 21–32)
COLOR UR AUTO: YELLOW
CREAT BLD-MCNC: 1.16 MG/DL
EOSINOPHIL # BLD AUTO: 0 X10(3) UL (ref 0–0.7)
EOSINOPHIL NFR BLD AUTO: 0 %
ERYTHROCYTE [DISTWIDTH] IN BLOOD BY AUTOMATED COUNT: 12.2 %
GLOBULIN PLAS-MCNC: 3.4 G/DL (ref 2.8–4.4)
GLUCOSE BLD-MCNC: 84 MG/DL (ref 70–99)
GLUCOSE UR STRIP.AUTO-MCNC: NEGATIVE MG/DL
HCT VFR BLD AUTO: 38.1 %
HGB BLD-MCNC: 13 G/DL
IMM GRANULOCYTES # BLD AUTO: 0.02 X10(3) UL (ref 0–1)
IMM GRANULOCYTES NFR BLD: 0.3 %
KETONES UR STRIP.AUTO-MCNC: 40 MG/DL
LEUKOCYTE ESTERASE UR QL STRIP.AUTO: NEGATIVE
LYMPHOCYTES # BLD AUTO: 0.37 X10(3) UL (ref 1–4)
LYMPHOCYTES NFR BLD AUTO: 6.2 %
MCH RBC QN AUTO: 30.9 PG (ref 26–34)
MCHC RBC AUTO-ENTMCNC: 34.1 G/DL (ref 31–37)
MCV RBC AUTO: 90.5 FL
MONOCYTES # BLD AUTO: 1.01 X10(3) UL (ref 0.1–1)
MONOCYTES NFR BLD AUTO: 17 %
NEUTROPHILS # BLD AUTO: 4.5 X10 (3) UL (ref 1.5–7.7)
NEUTROPHILS # BLD AUTO: 4.5 X10(3) UL (ref 1.5–7.7)
NEUTROPHILS NFR BLD AUTO: 76 %
NITRITE UR QL STRIP.AUTO: NEGATIVE
OSMOLALITY SERPL CALC.SUM OF ELEC: 278 MOSM/KG (ref 275–295)
PH UR STRIP.AUTO: 5.5 [PH] (ref 5–8)
PLATELET # BLD AUTO: 201 10(3)UL (ref 150–450)
POCT INFLUENZA A: NEGATIVE
POCT INFLUENZA B: NEGATIVE
POTASSIUM SERPL-SCNC: 3.4 MMOL/L (ref 3.5–5.1)
PROT SERPL-MCNC: 7.5 G/DL (ref 6.4–8.2)
PROT UR STRIP.AUTO-MCNC: NEGATIVE MG/DL
RBC # BLD AUTO: 4.21 X10(6)UL
RBC UR QL AUTO: NEGATIVE
SARS-COV-2 RNA RESP QL NAA+PROBE: DETECTED
SODIUM SERPL-SCNC: 135 MMOL/L (ref 136–145)
SP GR UR STRIP.AUTO: 1.02 (ref 1–1.03)
UROBILINOGEN UR STRIP.AUTO-MCNC: 0.2 MG/DL
WBC # BLD AUTO: 5.9 X10(3) UL (ref 4–11)

## 2022-05-25 PROCEDURE — 99284 EMERGENCY DEPT VISIT MOD MDM: CPT

## 2022-05-25 PROCEDURE — 87502 INFLUENZA DNA AMP PROBE: CPT | Performed by: EMERGENCY MEDICINE

## 2022-05-25 PROCEDURE — 85025 COMPLETE CBC W/AUTO DIFF WBC: CPT | Performed by: EMERGENCY MEDICINE

## 2022-05-25 PROCEDURE — 36415 COLL VENOUS BLD VENIPUNCTURE: CPT

## 2022-05-25 PROCEDURE — 81003 URINALYSIS AUTO W/O SCOPE: CPT | Performed by: EMERGENCY MEDICINE

## 2022-05-25 PROCEDURE — 87040 BLOOD CULTURE FOR BACTERIA: CPT | Performed by: EMERGENCY MEDICINE

## 2022-05-25 PROCEDURE — 71046 X-RAY EXAM CHEST 2 VIEWS: CPT | Performed by: EMERGENCY MEDICINE

## 2022-05-25 PROCEDURE — 96374 THER/PROPH/DIAG INJ IV PUSH: CPT

## 2022-05-25 PROCEDURE — 96361 HYDRATE IV INFUSION ADD-ON: CPT

## 2022-05-25 PROCEDURE — 80053 COMPREHEN METABOLIC PANEL: CPT | Performed by: EMERGENCY MEDICINE

## 2022-05-25 PROCEDURE — 72110 X-RAY EXAM L-2 SPINE 4/>VWS: CPT | Performed by: EMERGENCY MEDICINE

## 2022-05-25 RX ORDER — ACETAMINOPHEN 500 MG
1000 TABLET ORAL ONCE
Status: COMPLETED | OUTPATIENT
Start: 2022-05-25 | End: 2022-05-25

## 2022-05-25 RX ORDER — KETOROLAC TROMETHAMINE 15 MG/ML
15 INJECTION, SOLUTION INTRAMUSCULAR; INTRAVENOUS ONCE
Status: COMPLETED | OUTPATIENT
Start: 2022-05-25 | End: 2022-05-25

## 2022-05-25 RX ORDER — CYCLOBENZAPRINE HCL 10 MG
10 TABLET ORAL ONCE
Status: COMPLETED | OUTPATIENT
Start: 2022-05-25 | End: 2022-05-25

## 2022-05-25 RX ORDER — CYCLOBENZAPRINE HCL 10 MG
10 TABLET ORAL 3 TIMES DAILY PRN
Qty: 20 TABLET | Refills: 0 | Status: SHIPPED | OUTPATIENT
Start: 2022-05-25 | End: 2022-06-01

## 2022-05-26 NOTE — ED INITIAL ASSESSMENT (HPI)
Patient arrives with c/o bilateral lower back pain since yesterday, worse on the right side. Numbness present to bilateral legs since this morning. He states, \"I'm a  and my back gets stiff at times\". Patient also started running fevers today, home COVID test negative.

## 2023-08-14 ENCOUNTER — HOSPITAL ENCOUNTER (OUTPATIENT)
Age: 28
Discharge: HOME | End: 2023-08-14
Attending: EMERGENCY MEDICINE
Payer: COMMERCIAL

## 2023-08-14 ENCOUNTER — APPOINTMENT (OUTPATIENT)
Dept: GENERAL RADIOLOGY | Age: 28
End: 2023-08-14
Attending: EMERGENCY MEDICINE
Payer: COMMERCIAL

## 2023-08-14 VITALS
DIASTOLIC BLOOD PRESSURE: 69 MMHG | SYSTOLIC BLOOD PRESSURE: 125 MMHG | OXYGEN SATURATION: 95 % | TEMPERATURE: 98 F | BODY MASS INDEX: 24 KG/M2 | HEART RATE: 66 BPM | RESPIRATION RATE: 18 BRPM | WEIGHT: 180 LBS
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
## 2023-08-14 DIAGNOSIS — S39.012A LOW BACK STRAIN, INITIAL ENCOUNTER: ICD-10-CM

## 2023-08-14 PROCEDURE — 72110 X-RAY EXAM L-2 SPINE 4/>VWS: CPT | Performed by: EMERGENCY MEDICINE

## 2023-08-14 PROCEDURE — 99214 OFFICE O/P EST MOD 30 MIN: CPT

## 2023-08-14 PROCEDURE — 99213 OFFICE O/P EST LOW 20 MIN: CPT

## 2023-08-14 RX ORDER — NAPROXEN 500 MG/1
500 TABLET ORAL 2 TIMES DAILY PRN
Qty: 20 TABLET | Refills: 0 | Status: SHIPPED | OUTPATIENT
Start: 2023-08-14

## 2023-08-14 RX ORDER — CYCLOBENZAPRINE HCL 10 MG
10 TABLET ORAL 3 TIMES DAILY PRN
Qty: 20 TABLET | Refills: 0 | Status: SHIPPED | OUTPATIENT
Start: 2023-08-14 | End: 2023-08-21
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
## 2023-11-28 ENCOUNTER — HOSPITAL ENCOUNTER (OUTPATIENT)
Age: 28
Discharge: HOME | End: 2023-11-28
Attending: EMERGENCY MEDICINE

## 2023-11-28 VITALS
SYSTOLIC BLOOD PRESSURE: 145 MMHG | BODY MASS INDEX: 23.19 KG/M2 | TEMPERATURE: 99 F | WEIGHT: 175 LBS | RESPIRATION RATE: 16 BRPM | OXYGEN SATURATION: 99 % | HEART RATE: 68 BPM | DIASTOLIC BLOOD PRESSURE: 76 MMHG | HEIGHT: 73 IN

## 2023-11-28 DIAGNOSIS — K12.2 INFECTION OF MOUTH: Primary | ICD-10-CM

## 2023-11-28 PROCEDURE — 99214 OFFICE O/P EST MOD 30 MIN: CPT

## 2023-11-28 PROCEDURE — 99213 OFFICE O/P EST LOW 20 MIN: CPT

## 2023-11-28 RX ORDER — IBUPROFEN 800 MG/1
800 TABLET ORAL 2 TIMES DAILY PRN
COMMUNITY
Start: 2023-11-03

## 2023-11-28 RX ORDER — CLINDAMYCIN HYDROCHLORIDE 150 MG/1
150 CAPSULE ORAL 3 TIMES DAILY
Qty: 30 CAPSULE | Refills: 0 | Status: SHIPPED | OUTPATIENT
Start: 2023-11-28 | End: 2023-12-08

## 2023-11-28 RX ORDER — GABAPENTIN 100 MG/1
100 CAPSULE ORAL 3 TIMES DAILY
COMMUNITY
Start: 2023-10-06

## 2023-11-28 RX ORDER — CYCLOBENZAPRINE HCL 10 MG
10 TABLET ORAL 2 TIMES DAILY PRN
COMMUNITY
Start: 2023-11-03

## 2023-11-28 RX ORDER — CELECOXIB 100 MG/1
100 CAPSULE ORAL 2 TIMES DAILY
COMMUNITY
Start: 2023-11-13

## 2023-11-29 NOTE — ED INITIAL ASSESSMENT (HPI)
Patient states 6 days ago he started with numbness to the right cheek, right side of the tongue and right side of his lips. He states his right cheek is torn up inside form his braces and he had left over amoxicillin that he started taking the last 2 days for what he thought might have been an infection to his mouth. States he has had no improvement. Denies any changes to medications, foods, or anything else that would be an allergic reaction.

## 2023-11-29 NOTE — DISCHARGE INSTRUCTIONS
Contact your orthodontist in the morning to arrange reexamination  Switch antibiotic to clindamycin  Apply cool compress to your face 20 minutes at a time  Soft diet  Tylenol or Motrin for pain

## 2025-01-23 ENCOUNTER — HOSPITAL ENCOUNTER (OUTPATIENT)
Age: 30
Discharge: HOME | End: 2025-01-23

## 2025-01-23 VITALS
WEIGHT: 180 LBS | HEIGHT: 73 IN | SYSTOLIC BLOOD PRESSURE: 143 MMHG | DIASTOLIC BLOOD PRESSURE: 78 MMHG | TEMPERATURE: 99 F | RESPIRATION RATE: 18 BRPM | HEART RATE: 82 BPM | BODY MASS INDEX: 23.86 KG/M2 | OXYGEN SATURATION: 96 %

## 2025-01-23 DIAGNOSIS — J10.1 INFLUENZA B: Primary | ICD-10-CM

## 2025-01-23 LAB
BASOPHILS # BLD AUTO: 0.03 X10(3) UL (ref 0–0.2)
BASOPHILS NFR BLD AUTO: 0.7 %
BUN BLD-MCNC: 10 MG/DL (ref 7–18)
CHLORIDE BLD-SCNC: 99 MMOL/L (ref 98–112)
CO2 BLD-SCNC: 25 MMOL/L (ref 21–32)
CREAT BLD-MCNC: 1.1 MG/DL
EGFRCR SERPLBLD CKD-EPI 2021: 92 ML/MIN/1.73M2 (ref 60–?)
EOSINOPHIL # BLD AUTO: 0.01 X10(3) UL (ref 0–0.7)
EOSINOPHIL NFR BLD AUTO: 0.2 %
ERYTHROCYTE [DISTWIDTH] IN BLOOD BY AUTOMATED COUNT: 12.2 %
GLUCOSE BLD-MCNC: 83 MG/DL (ref 70–99)
HCT VFR BLD AUTO: 39.9 %
HCT VFR BLD AUTO: 40.4 %
HCT VFR BLD CALC: 42 %
HGB BLD-MCNC: 13.7 G/DL
HGB BLD-MCNC: 14.3 G/DL
IMM GRANULOCYTES # BLD AUTO: 0.01 X10(3) UL (ref 0–1)
IMM GRANULOCYTES NFR BLD: 0.2 %
ISTAT IONIZED CALCIUM FOR CHEM 8: 1.21 MMOL/L (ref 1.12–1.32)
LYMPHOCYTES # BLD AUTO: 0.65 X10(3) UL (ref 1–4)
LYMPHOCYTES NFR BLD AUTO: 14.5 %
MCH RBC QN AUTO: 30.7 PG (ref 26–34)
MCH RBC QN AUTO: 31.1 PG (ref 26–34)
MCHC RBC AUTO-ENTMCNC: 33.9 G/DL (ref 31–37)
MCHC RBC AUTO-ENTMCNC: 35.8 G/DL (ref 31–37)
MCV RBC AUTO: 86.7 FL
MCV RBC AUTO: 90.6 FL (ref 80–100)
MONOCYTES # BLD AUTO: 0.99 X10(3) UL (ref 0.1–1)
MONOCYTES NFR BLD AUTO: 22 %
NEUTROPHILS # BLD AUTO: 2.8 X10 (3) UL (ref 1.5–7.7)
NEUTROPHILS # BLD AUTO: 2.8 X10(3) UL (ref 1.5–7.7)
NEUTROPHILS NFR BLD AUTO: 62.4 %
PLATELET # BLD AUTO: 227 10(3)UL (ref 150–450)
PLATELET # BLD AUTO: 242 X10ˆ3/UL (ref 150–450)
POCT INFLUENZA A: NEGATIVE
POCT INFLUENZA B: POSITIVE
POTASSIUM BLD-SCNC: 3.9 MMOL/L (ref 3.6–5.1)
RBC # BLD AUTO: 4.46 X10ˆ6/UL
RBC # BLD AUTO: 4.6 X10(6)UL
SARS-COV-2 RNA RESP QL NAA+PROBE: NOT DETECTED
SODIUM BLD-SCNC: 137 MMOL/L (ref 136–145)
WBC # BLD AUTO: 4.5 X10(3) UL (ref 4–11)
WBC # BLD AUTO: 4.6 X10ˆ3/UL (ref 4–11)

## 2025-01-23 PROCEDURE — 99214 OFFICE O/P EST MOD 30 MIN: CPT

## 2025-01-23 PROCEDURE — 99215 OFFICE O/P EST HI 40 MIN: CPT

## 2025-01-23 PROCEDURE — 96361 HYDRATE IV INFUSION ADD-ON: CPT

## 2025-01-23 PROCEDURE — 85025 COMPLETE CBC W/AUTO DIFF WBC: CPT | Performed by: NURSE PRACTITIONER

## 2025-01-23 PROCEDURE — 96374 THER/PROPH/DIAG INJ IV PUSH: CPT

## 2025-01-23 PROCEDURE — 80047 BASIC METABLC PNL IONIZED CA: CPT

## 2025-01-23 PROCEDURE — 87502 INFLUENZA DNA AMP PROBE: CPT | Performed by: NURSE PRACTITIONER

## 2025-01-23 RX ORDER — ONDANSETRON 2 MG/ML
4 INJECTION INTRAMUSCULAR; INTRAVENOUS ONCE
Status: COMPLETED | OUTPATIENT
Start: 2025-01-23 | End: 2025-01-23

## 2025-01-23 RX ORDER — MECLIZINE HCL 12.5 MG 12.5 MG/1
25 TABLET ORAL ONCE
Status: COMPLETED | OUTPATIENT
Start: 2025-01-23 | End: 2025-01-23

## 2025-01-23 RX ORDER — ALBUTEROL SULFATE 90 UG/1
INHALANT RESPIRATORY (INHALATION) EVERY 6 HOURS PRN
COMMUNITY

## 2025-01-23 RX ORDER — OSELTAMIVIR PHOSPHATE 75 MG/1
75 CAPSULE ORAL 2 TIMES DAILY
Qty: 10 CAPSULE | Refills: 0 | Status: SHIPPED | OUTPATIENT
Start: 2025-01-23 | End: 2025-01-28

## 2025-01-23 RX ORDER — ONDANSETRON 4 MG/1
4 TABLET, ORALLY DISINTEGRATING ORAL EVERY 8 HOURS PRN
Qty: 10 TABLET | Refills: 0 | Status: SHIPPED | OUTPATIENT
Start: 2025-01-23 | End: 2025-01-30

## 2025-01-23 RX ORDER — MECLIZINE HCL 12.5 MG 12.5 MG/1
25 TABLET ORAL 3 TIMES DAILY PRN
Qty: 30 TABLET | Refills: 0 | Status: SHIPPED | OUTPATIENT
Start: 2025-01-23 | End: 2025-01-30

## 2025-01-23 RX ORDER — SODIUM CHLORIDE 9 MG/ML
1000 INJECTION, SOLUTION INTRAVENOUS ONCE
Status: COMPLETED | OUTPATIENT
Start: 2025-01-23 | End: 2025-01-23

## 2025-01-23 RX ORDER — ACETAMINOPHEN 500 MG
1000 TABLET ORAL ONCE
Status: COMPLETED | OUTPATIENT
Start: 2025-01-23 | End: 2025-01-23

## 2025-01-23 NOTE — DISCHARGE INSTRUCTIONS
Tylenol/motrin alternating as needed for pain.  Zofran for nausea and vomiting.  Increase oral fluids.  If worsening symptoms go to the ER.

## 2025-01-23 NOTE — ED PROVIDER NOTES
Patient Seen in: [facility]      History     Chief Complaint   Patient presents with    Lightheadedness    Cough    Headache     Stated Complaint: Lightheaded; Dehydrated    Subjective:   HPI  29-year-old with asthma presents complaining of flulike symptoms for the past 2 days.  Patient recently traveled from [place].    Objective:     Past Medical History:    Asthma (HCC)              Past Surgical History:   Procedure Laterality Date    Appendectomy                  Social History     Socioeconomic History    Marital status: Single   Tobacco Use    Smoking status: Former     Current packs/day: 0.00     Types: Cigarettes     Quit date: 2023     Years since quittin.5    Smokeless tobacco: Never    Tobacco comments:     2 packs a week   Vaping Use    Vaping status: Every Day   Substance and Sexual Activity    Alcohol use: Never    Drug use: Not Currently     Types: Cannabis     Social Drivers of Health      [facility] from [facility], [facility]    Social Connections    Received from [facility], [facility]    Housing Stability              Review of Systems   All other systems reviewed and are negative.      Positive for stated complaint: Lightheaded; Dehydrated  Other systems are as noted in HPI.  Constitutional and vital signs reviewed.      All other systems reviewed and negative except as noted above.    Physical Exam     ED Triage Vitals [25 1304]   /78   Pulse 82   Resp 18   Temp (!) 100.5 °F (38.1 °C)   Temp src Oral   SpO2 96 %   O2 Device None (Room air)       Current Vitals:   Vital Signs  BP: 143/78  Pulse: 82  Resp: 18  Temp: 98.5 °F (36.9 °C)  Temp src: Oral    Oxygen Therapy  SpO2: 96 %  O2 Device: None (Room air)        Physical Exam  Vitals and nursing note reviewed.   Constitutional:       General: He is not in acute distress.     Appearance: He is well-developed. He is not  ill-appearing or toxic-appearing.   HENT:      Right Ear: Tympanic membrane, ear canal and external ear normal.      Left Ear: Tympanic membrane, ear canal and external ear normal.      Nose: Congestion present. No rhinorrhea.      Mouth/Throat:      Pharynx: No oropharyngeal exudate or posterior oropharyngeal erythema.   Eyes:      Extraocular Movements: Extraocular movements intact.      Pupils: Pupils are equal, round, and reactive to light.      Comments: No nystagmus   Cardiovascular:      Rate and Rhythm: Normal rate and regular rhythm.      Heart sounds: Normal heart sounds.   Pulmonary:      Effort: Pulmonary effort is normal. No respiratory distress.      Breath sounds: Normal breath sounds. No wheezing.   Musculoskeletal:      Cervical back: Normal range of motion. No rigidity.   Skin:     General: Skin is warm and dry.   Neurological:      Mental Status: He is alert and oriented to person, place, and time.             ED Course     Labs Reviewed   CBC W AUTO DIFF - Abnormal; Notable for the following components:       Result Value    Lymphocyte Absolute 0.65 (*)     All other components within normal limits   POCT FLU TEST - Abnormal; Notable for the following components:    POCT INFLUENZA B Positive (*)     All other components within normal limits    Narrative:     This assay is a rapid molecular in vitro test utilizing nucleic acid amplification of influenza A and B viral RNA.   POCT ISTAT CHEM8 CARTRIDGE - Normal   RAPID SARS-COV-2 BY PCR - Normal   POCT CBC                   MDM       Medical Decision Making  29-year-old with asthma presents complaining of flulike symptoms for the past 2 days.  Patient recently traveled from [place].    Pertinent Labs & Imaging studies reviewed. (See chart for details).  Patient coming in with flu like symptoms.   Differential diagnosis includes but not limited to flu, COVID, bronchitis, pneumonia  Labs reviewed negative with influenza B positive.  CBC partially  resulted which was normal with normal chemistry.   Will treat for influenza B.  Will discharge on Zofran, Antivert, and Tamiflu. Patient/Parent is comfortable with this plan.    Fluids were given with Zofran and patient tolerated p.o.  Antivert was given for dizziness caused by influenza.  I discussed with patient and his wife good hydration and ER precautions.  Patient without hypoxia or tachycardia.  Care precautions provided.      Problems Addressed:  Influenza B: acute illness or injury    Amount and/or Complexity of Data Reviewed  Independent Historian: spouse        Disposition and Plan     Clinical Impression:  1. Influenza B         Disposition:  Discharge  1/23/2025  3:02 pm    Follow-up:  No follow-up provider specified.        Medications Prescribed:  Discharge Medication List as of 1/23/2025  3:07 PM        START taking these medications    Details   oseltamivir 75 MG Oral Cap Take 1 capsule (75 mg total) by mouth 2 (two) times daily for 5 days., Normal, Disp-10 capsule, R-0      ondansetron 4 MG Oral Tablet Dispersible Take 1 tablet (4 mg total) by mouth every 8 (eight) hours as needed., Normal, Disp-10 tablet, R-0      meclizine 12.5 MG Oral Tab Take 2 tablets (25 mg total) by mouth 3 (three) times daily as needed., Normal, Disp-30 tablet, R-0                 Supplementary Documentation:

## 2025-01-23 NOTE — ED INITIAL ASSESSMENT (HPI)
C/o onset  yesterday weakness, back hurting, nausea, headache,  feeling lightheaded /dizzy, light sensitive and vomited last night and today. Fever today. Cough and feeling shortness of breath.

## 2025-07-02 NOTE — ED NOTES
A/P:        Assessment & Plan  1. Diabetes Mellitus: Chronic, with hyperglycemia, A1C 7.4% on last evaluation.   - Difficulty obtaining Jardiance from the pharmacy. We contacted the [facility] pharmacy today and they say he did get his Jardiance and glargine in June, and now cannot get it before July 24th. He insists he does not have it.  - Will give him a sample of Tresiba 200 units/mL, three 3 mm pens to be used instead of his glargine insulin for the next 3 weeks.  At that point he should be able to  the glargine and Jardiance from the pharmacy.  - Advised to have wife take a list of medications to the pharmacy to ensure proper prescriptions are filled.    2. Neuropathy: Chronic.  - Currently taking Lyrica twice daily for nerve pain.  - Dosage was increased during the last visit; prescription sent to the pharmacy.  - Advised to continue taking Lyrica as prescribed.  - Glycemic control important to manage the neuropathy.    3. Hyperlipidemia: Chronic.  - Ran out of atorvastatin.  - Pharmacy to be contacted to confirm prescription availability.  - Advised to have wife take a list of medications to the pharmacy to ensure proper prescriptions are filled.    4. Acute Back Pain: Acute.  - No radicular symptoms or deficits.  - Prescription for medication to be taken with food twice daily for two weeks provided; may continue Tylenol.  - Advised to discontinue medication if significant improvement after one week; x-ray or physical therapy may be considered if symptoms persist.    5. Hearing loss.  I suggested he consider having an  when he goes to the pharmacy. Given a list of local interpreters.     Follow-up  - Follow-up scheduled for early September 2025.         Follow up: 2 months DM        An electronic signature was used to authenticate this note.    --Keke Cates MD         HPI: Hon RAJAN Adkins is here for a follow up visit:  services for [facility] used:  # [id number]      History of  REPORT FROM [facility] RN. PT RESTING ON CART WITH FAMILY AT BEDSIDE.  DENIES NEW OR WORSENING C/O.

## (undated) NOTE — ED AVS SNAPSHOT
Jojo Phillips   MRN: [id number]    Department:  [facility] Emergency Department   Date of Visit:  1/28/2019           Disclosure     Insurance plans vary and the physician(s) referred by the ER may not be covered by your plan.  Please contact your i tell this physician (or your personal doctor if your instructions are to return to your personal doctor) about any new or lasting problems. The primary care or specialist physician will see patients referred from the [facility] Emergency Department.  Kenyon Leyva

## (undated) NOTE — LETTER
Date & Time: 1/23/2025, 3:02 PM  Patient: Carlos Power  Encounter Provider(s):    Stephanie Meredith APRN       To Whom It May Concern:    Carlos Power was seen and treated in our department on 1/23/2025. He should not return to work until fever free for 24 hours without medications .    If you have any questions or concerns, please do not hesitate to call.        ___________S.Masoud__________________  Physician/APC Signature

## (undated) NOTE — LETTER
November 3, 2017    Patient: Chaparro Soto   Date of Visit: 11/3/2017       To Whom It May Concern: Chaparro Soto was seen and treated in our emergency department on 11/3/2017. He should not return to work until [address].     If y

## (undated) NOTE — ED AVS SNAPSHOT
Gabriella Hui   MRN: [id number]    Department:  THE [facility] Emergency Department in [place]   Date of Visit:  11/3/2017           Disclosure     Insurance plans vary and the physician(s) referred by the ER may not be covered by your plan.  Please contact y If you have been prescribed any medication(s), please fill your prescription right away and begin taking the medication(s) as directed    If the emergency physician has read X-rays, these will be re-interpreted by a radiologist.  If there is a significant

## (undated) NOTE — LETTER
[facility] [address]  Dept: [phone]  Dept Fax: [phone]         August 14, 2023    Patient: Cori Masters   YOB: 1995   Date of Visit: 8/14/2023       To Whom It May Concern: Cori Masters was seen and treated in our [facility] department on 8/14/2023. He may return to work on August 17, 2023  . If you have any questions or concerns, please don't hesitate to call.       Encounter Provider(s):    Chance High MD     6:52 PM  8/14/2023